# Patient Record
Sex: MALE | Race: WHITE
[De-identification: names, ages, dates, MRNs, and addresses within clinical notes are randomized per-mention and may not be internally consistent; named-entity substitution may affect disease eponyms.]

---

## 2019-07-15 ENCOUNTER — HOSPITAL ENCOUNTER (OUTPATIENT)
Dept: HOSPITAL 10 - SDS | Age: 49
Discharge: HOME | End: 2019-07-15
Attending: SURGERY
Payer: COMMERCIAL

## 2019-07-15 ENCOUNTER — HOSPITAL ENCOUNTER (OUTPATIENT)
Dept: HOSPITAL 91 - SDS | Age: 49
Discharge: HOME | End: 2019-07-15
Payer: COMMERCIAL

## 2019-07-15 VITALS — SYSTOLIC BLOOD PRESSURE: 118 MMHG | RESPIRATION RATE: 17 BRPM | DIASTOLIC BLOOD PRESSURE: 81 MMHG | HEART RATE: 108 BPM

## 2019-07-15 VITALS — SYSTOLIC BLOOD PRESSURE: 123 MMHG | DIASTOLIC BLOOD PRESSURE: 79 MMHG | RESPIRATION RATE: 16 BRPM | HEART RATE: 84 BPM

## 2019-07-15 VITALS — HEART RATE: 92 BPM | RESPIRATION RATE: 16 BRPM | SYSTOLIC BLOOD PRESSURE: 120 MMHG | DIASTOLIC BLOOD PRESSURE: 79 MMHG

## 2019-07-15 VITALS
WEIGHT: 221.79 LBS | BODY MASS INDEX: 31.05 KG/M2 | BODY MASS INDEX: 31.05 KG/M2 | BODY MASS INDEX: 31.05 KG/M2 | HEIGHT: 71 IN | WEIGHT: 221.79 LBS | HEIGHT: 71 IN

## 2019-07-15 VITALS — HEART RATE: 90 BPM | SYSTOLIC BLOOD PRESSURE: 120 MMHG | RESPIRATION RATE: 16 BRPM | DIASTOLIC BLOOD PRESSURE: 68 MMHG

## 2019-07-15 VITALS — SYSTOLIC BLOOD PRESSURE: 101 MMHG | HEART RATE: 89 BPM | DIASTOLIC BLOOD PRESSURE: 80 MMHG | RESPIRATION RATE: 16 BRPM

## 2019-07-15 VITALS — RESPIRATION RATE: 12 BRPM | DIASTOLIC BLOOD PRESSURE: 74 MMHG | SYSTOLIC BLOOD PRESSURE: 117 MMHG | HEART RATE: 104 BPM

## 2019-07-15 VITALS — HEART RATE: 91 BPM | DIASTOLIC BLOOD PRESSURE: 97 MMHG | RESPIRATION RATE: 18 BRPM | SYSTOLIC BLOOD PRESSURE: 136 MMHG

## 2019-07-15 VITALS — DIASTOLIC BLOOD PRESSURE: 79 MMHG | HEART RATE: 84 BPM | RESPIRATION RATE: 14 BRPM | SYSTOLIC BLOOD PRESSURE: 123 MMHG

## 2019-07-15 VITALS — SYSTOLIC BLOOD PRESSURE: 115 MMHG | HEART RATE: 98 BPM | RESPIRATION RATE: 14 BRPM | DIASTOLIC BLOOD PRESSURE: 86 MMHG

## 2019-07-15 VITALS — HEART RATE: 104 BPM | SYSTOLIC BLOOD PRESSURE: 117 MMHG | RESPIRATION RATE: 12 BRPM | DIASTOLIC BLOOD PRESSURE: 74 MMHG

## 2019-07-15 VITALS — RESPIRATION RATE: 16 BRPM | SYSTOLIC BLOOD PRESSURE: 122 MMHG | DIASTOLIC BLOOD PRESSURE: 74 MMHG | HEART RATE: 94 BPM

## 2019-07-15 VITALS — HEART RATE: 82 BPM | SYSTOLIC BLOOD PRESSURE: 123 MMHG | DIASTOLIC BLOOD PRESSURE: 79 MMHG | RESPIRATION RATE: 17 BRPM

## 2019-07-15 VITALS — SYSTOLIC BLOOD PRESSURE: 118 MMHG | DIASTOLIC BLOOD PRESSURE: 81 MMHG

## 2019-07-15 VITALS — HEART RATE: 98 BPM | SYSTOLIC BLOOD PRESSURE: 115 MMHG | RESPIRATION RATE: 14 BRPM | DIASTOLIC BLOOD PRESSURE: 86 MMHG

## 2019-07-15 VITALS — SYSTOLIC BLOOD PRESSURE: 104 MMHG | HEART RATE: 94 BPM | DIASTOLIC BLOOD PRESSURE: 70 MMHG | RESPIRATION RATE: 16 BRPM

## 2019-07-15 DIAGNOSIS — Z79.84: ICD-10-CM

## 2019-07-15 DIAGNOSIS — K42.0: ICD-10-CM

## 2019-07-15 DIAGNOSIS — E11.9: ICD-10-CM

## 2019-07-15 DIAGNOSIS — I10: ICD-10-CM

## 2019-07-15 DIAGNOSIS — K40.90: Primary | ICD-10-CM

## 2019-07-15 PROCEDURE — 49505 PRP I/HERN INIT REDUC >5 YR: CPT

## 2019-07-15 PROCEDURE — 88302 TISSUE EXAM BY PATHOLOGIST: CPT

## 2019-07-15 PROCEDURE — 82962 GLUCOSE BLOOD TEST: CPT

## 2019-07-15 PROCEDURE — C1781 MESH (IMPLANTABLE): HCPCS

## 2019-07-15 PROCEDURE — 49587: CPT

## 2019-07-15 RX ADMIN — THIAMINE HYDROCHLORIDE 1 MLS/HR: 100 INJECTION, SOLUTION INTRAMUSCULAR; INTRAVENOUS at 06:30

## 2019-07-15 RX ADMIN — HYDROMORPHONE HYDROCHLORIDE 1 MG: 2 INJECTION INTRAMUSCULAR; INTRAVENOUS; SUBCUTANEOUS at 10:40

## 2019-07-15 RX ADMIN — BUPIVACAINE HYDROCHLORIDE AND EPINEPHRINE BITARTRATE 1 ML: 2.5; .005 INJECTION, SOLUTION EPIDURAL; INFILTRATION; INTRACAUDAL; PERINEURAL at 08:20

## 2019-07-15 RX ADMIN — HYDROCODONE BITARTRATE AND ACETAMINOPHEN 1 TAB: 5; 325 TABLET ORAL at 12:24

## 2019-07-15 RX ADMIN — MORPHINE SULFATE 1 MG: 2 INJECTION, SOLUTION INTRAMUSCULAR; INTRAVENOUS at 10:25

## 2019-07-15 RX ADMIN — METOCLOPRAMIDE HYDROCHLORIDE 1 MG: 10 INJECTION, SOLUTION INTRAMUSCULAR; INTRAVENOUS at 11:04

## 2019-07-15 RX ADMIN — BACITRACIN 1 ML: 50000 INJECTION, POWDER, FOR SOLUTION INTRAMUSCULAR at 08:20

## 2019-07-15 NOTE — PAC
Date/Time of Note


Date/Time of Note


DATE: 7/15/19 


TIME: 10:01





Post-Anesthesia Notes


Post-Anesthesia Note


Last documented vital signs





Vital Signs


  Date      Temp  Pulse  Resp  B/P (MAP)   Pulse Ox  O2          O2 Flow    FiO2


Time                                                 Delivery    Rate


   7/15/19  97.9     91    18      136/97        97  Room Air


      1002                          (110)





Activity:  WNL


Respiratory function:  WNL


Cardiovascular function:  WNL


Mental status:  Baseline


Pain reasonably controlled:  Yes


Hydration appropriate:  Yes


Nausea/Vomiting absent:  Yes











BESSY OBRIEN                 Jul 15, 2019 10:02

## 2019-07-15 NOTE — HPN
Date/Time of Note


Date/Time of Note


DATE: 7/15/19 


TIME: 07:32





Interval H&P Admission Note


Pt. seen H&P reviewed:  No system changes











HEIDY SMITH MD            Jul 15, 2019 07:32

## 2019-07-15 NOTE — OPR
Date/Time of Note


Date/Time of Note


DATE: 7/15/19 


TIME: 09:51





Operative Report


Procedure Date:  Jul 15, 2019


Preoperative Diagnosis


1.  Right inguinal hernia without obstruction or gangrene


2.  Ventral/umbilical hernia chronically incarcerated with fat


Postoperative Diagnosis


1.  Right inguinal hernia without obstruction or gangrene


2.  Ventral/umbilical hernia chronically incarcerated with fat


Operation/Procedure Performed


1.  Open right inguinal hernia repair with mesh


2.  Open repair of ventral/umbilical hernia with mesh


3.  Right ilioinguinal nerve block


Surgeon


see signature line


Assistant


None


Anesthesia Type:  general


Anesthesiologist:  BESSY OBRIEN


Estimated Blood Loss:  minimal


Transfusion


   none


Specimen


Ventral/umbilical hernia contents


Grafts/Implants


1.  Ethicon ultra pro plug and patch size large


2.  Ethicon proceed ventral hernia patch size small


Complications


none


Pt Condition Post Procedure:  stable


Disposition:  PACU


Indications


The patient is a overweight 49-year-old male with a history of hypertension and 


diabetes who presented to the office complaining of a painful right groin bulge 


as well as a ventral abdominal wall bulge involving the umbilicus.  He was 


diagnosed on clinical exam as having a right inguinal hernia along with a fat-


containing ventral/umbilical hernia which was chronically nonreducible and fat-


containing. The patient was scheduled for simultaneous open right inguinal 


hernia repair with mesh and repair of his ventral/umbilical hernia to prevent 


sequelae of hernia disease which include, but are not limited to: Incarceration 


and strangulation. All risks and benefits of the procedure including but not 


limited to: Wound infection, excessive bleeding, postoperative seroma/hematoma 


formation, nerve injury which may be temporary versus permanent, injury to the 


reproductive organs including the vas deferens and the testicle which may lead 


to testicular atrophy, injury to intra-abdominal organs necessitating subsequent


operation, hernia recurrence, chronic pain, etc. were all explained to the 


patient full detail. The patient fully understood and wished to proceed with the


procedure. Informed consent was therefore obtained.


Procedure Description


The patient was brought to the operating room and placed supine on the operating


table.  Bilateral sequential compression devices were placed on both lower 


extremities.  A dose of broad-spectrum perioperative intravenous antibiotics was


given.  After the induction of smooth general anesthesia the patient's abdomen 


and bilateral groins were prepped and draped in standard surgical fashion.  


Attention was first turned to the repair of the right internal hernia.  After 


performance of the surgical timeout 0.25% Marcaine with epinephrine was injected


over the area of the incision.  Incision was then made using a 15 blade scalpel 


from the right pubic tubercle towards the right anterior superior iliac spine.  


Incision was carried down through the skin into the subcutaneous tissues using 


Bovie electrocautery.  Carol's fascia was incised and the aponeurosis of the 


external oblique muscle was reached.  The aponeurosis of the external oblique 


muscle was then incised in the direction of its fibers using a 15 blade scalpel 


and further opened using Metzenbaum scissors.  Spermatic cord was identified.  


There was fatty replacement of the cord.  Using blunt dissection the spermatic 


cord was then mobilized off of the floor of the inguinal canal and encircled 


using a Penrose drain.  There is chronic scarring of the cord to the floor of 


the inguinal canal so tedious dissection was done to safely free it so that it 


can be isolated. The cord structures were identified and preserved throughout 


the entirety of the procedure.  Cremasteric muscles were incised and dissection 


of the cord was begun.  A large indirect hernia sac was identified.  It was 


dissected off of the cord.  Dissection was continued towards the neck of the 


hernia.  There is chronic scarring and fibrosis of the cord to the hernia sac.  


Once completely dissected off of the cord the sac was then able to be reduced 


back into the intra-abdominal cavity in its entirety.  Weakening and blowout of 


the floor of the inguinal canal was identified.  The indirect hernia defect was 


then repaired using a large sized Ethicon ultra pro plug.  The plug was sutured 


in place using interrupted 3-0 Vicryl sutures. An onlay mesh was then used to 


reconstruct the floor of the inguinal canal.  It was secured in place using 


interrupted 2-0 Novafil sutures.  Both the plug and the mesh were soaked in 


antibiotic irrigation prior to placement in the field.  A slit was made in the 


mesh to accommodate the spermatic cord.  With the repair complete it was 


examined and noted to be hemostatic and tension-free.  The wound cavity was then


irrigated with more antibiotic containing irrigation.  Spermatic cord was then 


placed back into its anatomical position.  The aponeurosis of the external 


oblique muscle was then reapproximated using a running 3-0 Vicryl suture.  


Incision was then closed in layers using a running 3-0 Vicryl suture for the 


Carol's fascia.  The skin was then reapproximated using 4-0 Monocryl suture in 


a running subcuticular fashion.  Attention was then turned to the right 


ilioinguinal nerve block.  10 cc of 0.25% Marcaine with epinephrine was then 


injected in a radial fashion approximately 2 fingerbreadths medial and inferior 


to the right anterior superior iliac spine. 





Attention was then turned towards repair of the ventral/umbilical abdominal wall


hernia. 0.25% Marcaine with epinephrine was injected around the area of the 


incision.  An infraumbilical semicircular incision was then made using a 15 


blade scalpel.  It was carried down through the skin and the dermis.  Blunt 


dissection was then done using Franny clamps to the level of the anterior rectus 


fascia.  The ventral/umbilical hernia involved the umbilicus.  The umbilicus 


was, therefore, encircled using a Franny clamp.  A fat-containing 


umbilical/ventral hernia was identified.  The umbilicus was then transected at 


its base and the sac dissected off of the umbilicus.  The hernia contents were 


transected and passed off the field as specimen.  The hernia defect measured 


approximately 1 cm.  A small Ethicon proceed ventral hernia patch was then used 


to repair the hernia defect.  Its tails were secured to the fascia using 


interrupted 2-0 PDS sutures.  The mesh was soaked in antibiotic irrigation prior


to insertion into the field. With the repair complete it was inspected and noted


to be tension-free and hemostatic.  The wound cavity was then irrigated with 


more antibiotic irrigation.  The fascia was then reapproximated over the mesh 


using a #1 PDS suture in figure-of-eight fashion.  The umbilicus was then tacked


back down to the fascia using interrupted 3-0 Vicryl suture.  Incision was then 


closed in layers using interrupted 3-0 Vicryl sutures for the dermal layer.  The


skin was reapproximated using a running 4-0 Monocryl suture in subcuticular 


fashion. Both incisions were cleaned and Dermabond was applied as well as an 


abdominal binder.  Both testicles were palpated and noted to be in their 


anatomical position at the end of the case.  The patient was awoken from 


anesthesia and transported to the recovery room in stable condition.





All counts were correct at the end of the case 2.











HEIDY SMITH MD            Jul 15, 2019 10:03

## 2019-07-15 NOTE — PREAC
Date/Time of Note


Date/Time of Note


DATE: 7/15/19 


TIME: 07:14





Anesthesia Eval and Record


Evaluation


Time Pre-Procedure Interview


DATE: 7/15/19 


TIME: 07:14


Age


49


Sex


male


NPO:  8 hrs


Preoperative diagnosis


ventral and inguinal hernia


Planned procedure


ventral hernia and right inguinal hernia repair





Past Medical History


Past Medical History:  Includes


Cardio:  HTN


Endo:  Diabetes


GI:  Obesity





Surgery & Anesthesia Issues


No known issue





Meds


Anticoagulation:  No


Beta Blocker within 24 hr:  No


Reason Beta Blocker not given:  Pt. not on B-Blocker


Reported Medications


Amlodipine Besylate* (Amlodipine Besylate*) 10 Mg Tablet, 10 MG PO DAILY, #30 


TAB


   7/15/19


Hydrochlorothiazide* (Hydrochlorothiazide*) 25 Mg Tab, 25 MG PO DAILY, #30 TAB


   7/15/19


Losartan Potassium* (Losartan Potassium*) 100 Mg Tablet, 100 MG PO DAILY, TAB


   7/15/19


Simvastatin* (Zocor*) 20 Mg Tablet, 20 MG PO QHS, #30 TAB


   7/15/19


Metformin Hcl* (Metformin Hcl*) 500 Mg Tablet, 500 MG PO WITH BREAKFAST DINNE, 


#60 TAB


   7/15/19


Zolpidem Tartrate* (Ambien*) 10 Mg Tablet, 10 MG PO QHS PRN for INSOMNIA, TAB


   7/15/19





Current Medications


Sodium Chloride 1,000 ml @  75 mls/hr [id number] ONCE IV  Last administered on 


7/15/19at 06:30; Admin Dose 75 MLS/HR;  Start 7/15/19 at 05:30;  Stop 7/15/19 at


18:49


Meds reviewed:  Yes





Allergies


Coded Allergies:  


     No Known Allergy (Unverified , 7/15/19)


Allergies Reviewed:  Yes





Labs/Studies


Labs Reviewed:  Reviewed by anesthesiologist


Pregnancy test:  N/A


Studies:  ECG, CXR





Pre-procedure Exam


Last vitals





Vital Signs


  Date      Temp  Pulse  Resp  B/P (MAP)   Pulse Ox  O2          O2 Flow    FiO2


Time                                                 Delivery    Rate


   7/15/19  97.9     91    18      136/97        97  Room Air


     06:30                          (110)





Airway:  Adequate mouth opening, Adequate thyromental dist


Mallampati:  Mallampati II


Teeth:  Normal


Lung:  Normal


Heart:  Normal





ASA Physical Status


ASA physical status:  2


Emergency:  None





Planned Anesthetic


General/MAC:  ETT





Planned Pain Management


Parenteral pain med





Pre-operative Attestations


Prior to commencing anesthesia and surgery, the patient was re-evaluated, there 


was verification of:


*The patient's identity


*The results of appropriate recent lab work and preoperative vital signs


*The above evaluation not changing prior to induction


*Anesthetic plan, risk benefits, alternative and complications discussed with 


patient/family; questions answered; patient/family understands, accepts and 


wishes to proceed.











BESSY OBRIEN                 Jul 15, 2019 07:15